# Patient Record
Sex: MALE | Race: WHITE
[De-identification: names, ages, dates, MRNs, and addresses within clinical notes are randomized per-mention and may not be internally consistent; named-entity substitution may affect disease eponyms.]

---

## 2019-04-03 ENCOUNTER — HOSPITAL ENCOUNTER (EMERGENCY)
Dept: HOSPITAL 80 - FED | Age: 54
Discharge: HOME | End: 2019-04-03
Payer: MEDICAID

## 2019-04-03 VITALS — DIASTOLIC BLOOD PRESSURE: 79 MMHG | SYSTOLIC BLOOD PRESSURE: 129 MMHG

## 2019-04-03 DIAGNOSIS — R41.82: Primary | ICD-10-CM

## 2019-04-03 DIAGNOSIS — E78.5: ICD-10-CM

## 2019-04-03 LAB — PLATELET # BLD: 268 10^3/UL (ref 150–400)
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
